# Patient Record
Sex: FEMALE | Race: ASIAN | NOT HISPANIC OR LATINO | Employment: FULL TIME | URBAN - METROPOLITAN AREA
[De-identification: names, ages, dates, MRNs, and addresses within clinical notes are randomized per-mention and may not be internally consistent; named-entity substitution may affect disease eponyms.]

---

## 2022-02-19 ENCOUNTER — HOSPITAL ENCOUNTER (EMERGENCY)
Facility: MEDICAL CENTER | Age: 32
End: 2022-02-19
Attending: EMERGENCY MEDICINE
Payer: OTHER MISCELLANEOUS

## 2022-02-19 ENCOUNTER — APPOINTMENT (OUTPATIENT)
Dept: RADIOLOGY | Facility: MEDICAL CENTER | Age: 32
End: 2022-02-19
Attending: EMERGENCY MEDICINE
Payer: OTHER MISCELLANEOUS

## 2022-02-19 ENCOUNTER — PATIENT OUTREACH (OUTPATIENT)
Dept: HEALTH INFORMATION MANAGEMENT | Facility: OTHER | Age: 32
End: 2022-02-19
Payer: OTHER MISCELLANEOUS

## 2022-02-19 VITALS
BODY MASS INDEX: 28.84 KG/M2 | TEMPERATURE: 97.8 F | RESPIRATION RATE: 16 BRPM | DIASTOLIC BLOOD PRESSURE: 75 MMHG | HEIGHT: 61 IN | SYSTOLIC BLOOD PRESSURE: 108 MMHG | HEART RATE: 87 BPM | WEIGHT: 152.78 LBS | OXYGEN SATURATION: 96 %

## 2022-02-19 DIAGNOSIS — S09.90XA CLOSED HEAD INJURY, INITIAL ENCOUNTER: ICD-10-CM

## 2022-02-19 DIAGNOSIS — S16.1XXA STRAIN OF NECK MUSCLE, INITIAL ENCOUNTER: ICD-10-CM

## 2022-02-19 DIAGNOSIS — R42 VERTIGO: ICD-10-CM

## 2022-02-19 PROCEDURE — 70450 CT HEAD/BRAIN W/O DYE: CPT

## 2022-02-19 PROCEDURE — 99283 EMERGENCY DEPT VISIT LOW MDM: CPT

## 2022-02-19 PROCEDURE — 700102 HCHG RX REV CODE 250 W/ 637 OVERRIDE(OP): Performed by: EMERGENCY MEDICINE

## 2022-02-19 PROCEDURE — A9270 NON-COVERED ITEM OR SERVICE: HCPCS | Performed by: EMERGENCY MEDICINE

## 2022-02-19 RX ORDER — MECLIZINE HYDROCHLORIDE 25 MG/1
25 TABLET ORAL 3 TIMES DAILY PRN
Qty: 30 TABLET | Refills: 0 | Status: SHIPPED | OUTPATIENT
Start: 2022-02-19 | End: 2022-02-19 | Stop reason: SDUPTHER

## 2022-02-19 RX ORDER — MECLIZINE HYDROCHLORIDE 25 MG/1
25 TABLET ORAL 3 TIMES DAILY PRN
Qty: 30 TABLET | Refills: 0 | Status: SHIPPED | OUTPATIENT
Start: 2022-02-19

## 2022-02-19 RX ORDER — MECLIZINE HYDROCHLORIDE 25 MG/1
25 TABLET ORAL ONCE
Status: COMPLETED | OUTPATIENT
Start: 2022-02-19 | End: 2022-02-19

## 2022-02-19 RX ADMIN — MECLIZINE HYDROCHLORIDE 25 MG: 25 TABLET ORAL at 15:50

## 2022-02-19 NOTE — ED TRIAGE NOTES
Ashley Najera  31 y.o.  female  Chief Complaint   Patient presents with   • T-5000     Pt was tubing yesterday & collided with another tube, then fell back rosales in the snow - pt does not believe that she hit her head, no LOC, but c/o right sided neck pain with movement & nausea/vomiting + dizziness. No blood thinners.     No bony C-spine tenderness.   Patient educated on triage process, to alert staff if any changes in condition.

## 2022-02-19 NOTE — DISCHARGE INSTRUCTIONS
Have referred you for ED follow-up as an outpatient.  Hopefully they will be calling you for an outpatient primary care physician appointment.

## 2022-02-20 NOTE — ED NOTES
Patient discharged home per ERP.  Discharge teaching and education discussed with patient. POC discussed.   Patient verbalized understanding of discharge teaching and education. No other questions at this time.     RX x 1 given to patient.     VSS. Patient alert and oriented. Patient arranged ride for self. Able to ambulate off unit safely with steady gait.

## 2022-02-20 NOTE — ED PROVIDER NOTES
ED Provider Note    CHIEF COMPLAINT  Chief Complaint   Patient presents with   • T-5000     Pt was tubing yesterday & collided with another tube, then fell back rosales in the snow - pt does not believe that she hit her head, no LOC, but c/o right sided neck pain with movement & nausea/vomiting + dizziness. No blood thinners.       HPI  Ashley Najera is a 31 y.o. female who presents to the emergency department with complaint of hitting her head yesterday and now some vertigo symptoms.  She states she was sledding yesterday, off the wrong way, she hit the back of her head resulting in increasing her symptoms of vertigo.  She has a longstanding history of vertigo is intermittent but now when she looks down or turns her head to the side she has vertigo-like symptoms.  Is short-lived, fatigable, she has had no nausea, no vomiting associated as well.  She complains of anterior neck pain but no posterior neck pain, denies fever, shakes, chills, sweats, vomiting, loss of sensation or strength arms or legs, back pain, chest pain, abdominal pain.  She also endorse the fact that she has a sinus infection as she has had pain in her sinuses and increasing severity last several weeks.  She has been using a nasal steroid for this as well.  REVIEW OF SYSTEMS  Positives as above. Pertinent negatives include fever, shakes, chills, sweats, loss of sensation or strength arms or legs, back pain, abdominal pain, extremity pain   All other 10 review of systems are negative    PAST MEDICAL HISTORY  No past medical history on file.    FAMILY HISTORY  Noncontributory    SOCIAL HISTORY  Social History     Socioeconomic History   • Marital status: Single       SURGICAL HISTORY  No past surgical history on file.    CURRENT MEDICATIONS  Home Medications     Reviewed by Shari Arguello R.N. (Registered Nurse) on 02/19/22 at 1409  Med List Status: <None>   Medication Last Dose Status        Patient Georgi Taking any Medications               "         ALLERGIES  Not on File    PHYSICAL EXAM  VITAL SIGNS: /75   Pulse 87   Temp 36.6 °C (97.8 °F) (Temporal)   Resp 16   Ht 1.549 m (5' 1\")   Wt 69.3 kg (152 lb 12.5 oz)   LMP 02/17/2022   SpO2 96%   BMI 28.87 kg/m²      Constitutional: Well developed, Well nourished, No acute distress, Non-toxic appearance.   Eyes: PERRLA, EOMI, Conjunctiva normal, No discharge.  Neck: No posterior cervical spine tenderness or step-off deformity, she has anterior strap muscle tenderness bilaterally, no pulsatile mass  HENT: No oropharyngeal erythema, edema, no nasal discharge slight tenderness to the frontal and maxillary sinuses  Cardiovascular: Normal heart rate, Normal rhythm, No murmurs, No rubs, No gallops, and intact distal pulses.   Thorax & Lungs:  No respiratory distress, no rales, no rhonchi, No wheezing, No chest wall tenderness.   Abdomen: Bowel sounds normal, Soft, No tenderness, No guarding, No rebound, No pulsatile masses.   Skin: Warm, Dry, No erythema, No rash.   Extremities: Full range of motion, no deformity, no edema.  Musculoskeletal: No thoracic or lumbar spinous process tenderness or step-off deformity  Neurologic:  Alert & oriented to month and age, Normal cognition, Cranial nerves II-XII are intact, No slurred speech, Negative finger to nose bilaterally, No pronator drift bilaterally,   strength 5/5 bilaterally, Leg raise strength 5/5 bilaterally, Plantarflexion strength 5/5 bilaterally, Dorsiflexion strength 5/5 bilaterally, Deep tendon reflexes 2/4 upper and lower extremities bilaterally, Sensation intact throughout, No Nystagmus.  Positive Excel-Hallpike maneuver  Psychiatric: Affect normal for clinical presentation.      RADIOLOGY/PROCEDURES  CT-HEAD W/O   Final Result      No acute intracranial findings.                     COURSE & MEDICAL DECISION MAKING  Pertinent Labs & Imaging studies reviewed. (See chart for details)  This is a pleasant 31-year-old female that presents with a " closed head injury and vertigo.  She does have longstanding history of vertigo and now is exacerbated with a closed head injury.  CT scan of the brain was completed was negative for acute intracranial hemorrhage, edema or significant abnormality.  CT was completed secondary to her trauma and neurological deficit.  In addition, she has no evidence of sinusitis.  She does have reproducible vertigo symptoms with a Jordan-Hallpike maneuver.  I have referred her for an outpatient evaluation with her primary care physician there for ED follow-up has been ordered.  In addition, the patient received meclizine here.  Prescription for meclizine has been given.  As for her cervical spine, she has no spinous process tenderness, she has anterior muscle spasm I do believe she has a cervical strain I asked her to utilize Tylenol and ibuprofen for pain and ice.      FINAL IMPRESSION     1. Closed head injury, initial encounter Active   2. Vertigo Active   3. Strain of neck muscle, initial encounter    E.  Cervical strain    DISPOSITION:  Patient will be discharged home in stable condition.    FOLLOW UP:  [facility], Emergency Dept  [address] [facility] [address] [phone]    If symptoms worsen    [facility] ([facility] - Primary Care  [address] [facility] [address]  [phone]    Please call [facility] to establish with a Primary Care Provider. This office offers sliding fee scales based on income. Thank you.    Electronically signed by: Israel Cuevas D.O., 2/19/2022 5:40 PM

## 2022-02-22 NOTE — DISCHARGE PLANNING
note:   requested medical records to be sent to Dr. Pramod Nickerson at fax # [phone]. Done.  notified.